# Patient Record
Sex: FEMALE | Race: BLACK OR AFRICAN AMERICAN | NOT HISPANIC OR LATINO | Employment: FULL TIME | URBAN - METROPOLITAN AREA
[De-identification: names, ages, dates, MRNs, and addresses within clinical notes are randomized per-mention and may not be internally consistent; named-entity substitution may affect disease eponyms.]

---

## 2023-10-02 PROBLEM — O14.90 PRE-ECLAMPSIA (HHS-HCC): Status: ACTIVE | Noted: 2023-10-02

## 2023-10-02 PROBLEM — R87.610 ASCUS WITH POSITIVE HIGH RISK HPV CERVICAL: Status: ACTIVE | Noted: 2023-10-02

## 2023-10-02 PROBLEM — R87.810 ASCUS WITH POSITIVE HIGH RISK HPV CERVICAL: Status: ACTIVE | Noted: 2023-10-02

## 2023-10-02 PROBLEM — R74.8 ELEVATED LIVER ENZYMES: Status: ACTIVE | Noted: 2023-10-02

## 2023-10-02 RX ORDER — IBUPROFEN 600 MG/1
600 TABLET ORAL EVERY 6 HOURS
COMMUNITY
Start: 2020-03-02 | End: 2023-10-04 | Stop reason: ALTCHOICE

## 2023-10-02 RX ORDER — NIFEDIPINE 30 MG/1
1 TABLET, FILM COATED, EXTENDED RELEASE ORAL DAILY
COMMUNITY
Start: 2020-03-11 | End: 2023-10-04 | Stop reason: ALTCHOICE

## 2023-10-02 RX ORDER — DOCUSATE SODIUM 100 MG/1
100 CAPSULE, LIQUID FILLED ORAL 2 TIMES DAILY PRN
COMMUNITY
Start: 2020-03-02 | End: 2023-10-04 | Stop reason: ALTCHOICE

## 2023-10-02 RX ORDER — ETONOGESTREL 68 MG/1
IMPLANT SUBCUTANEOUS
COMMUNITY
Start: 2020-03-31 | End: 2023-10-04 | Stop reason: ALTCHOICE

## 2023-10-04 ENCOUNTER — LAB (OUTPATIENT)
Dept: LAB | Facility: LAB | Age: 38
End: 2023-10-04
Payer: COMMERCIAL

## 2023-10-04 ENCOUNTER — OFFICE VISIT (OUTPATIENT)
Dept: OBSTETRICS AND GYNECOLOGY | Facility: CLINIC | Age: 38
End: 2023-10-04
Payer: COMMERCIAL

## 2023-10-04 DIAGNOSIS — Z11.3 ROUTINE SCREENING FOR STI (SEXUALLY TRANSMITTED INFECTION): ICD-10-CM

## 2023-10-04 DIAGNOSIS — Z01.419 WELL WOMAN EXAM WITH ROUTINE GYNECOLOGICAL EXAM: ICD-10-CM

## 2023-10-04 DIAGNOSIS — Z30.09 CONTRACEPTIVE EDUCATION: ICD-10-CM

## 2023-10-04 DIAGNOSIS — Z30.013 ENCOUNTER FOR INITIAL PRESCRIPTION OF INJECTABLE CONTRACEPTIVE: ICD-10-CM

## 2023-10-04 DIAGNOSIS — Z32.02 PREGNANCY TEST NEGATIVE: Primary | ICD-10-CM

## 2023-10-04 LAB
HBV SURFACE AG SERPL QL IA: NONREACTIVE
HCV AB SER QL: NONREACTIVE
HIV 1+2 AB+HIV1 P24 AG SERPL QL IA: NONREACTIVE

## 2023-10-04 PROCEDURE — 36415 COLL VENOUS BLD VENIPUNCTURE: CPT

## 2023-10-04 PROCEDURE — 81025 URINE PREGNANCY TEST: CPT

## 2023-10-04 PROCEDURE — 87389 HIV-1 AG W/HIV-1&-2 AB AG IA: CPT

## 2023-10-04 PROCEDURE — 99213 OFFICE O/P EST LOW 20 MIN: CPT | Mod: SB

## 2023-10-04 PROCEDURE — 87340 HEPATITIS B SURFACE AG IA: CPT

## 2023-10-04 PROCEDURE — 87491 CHLMYD TRACH DNA AMP PROBE: CPT

## 2023-10-04 PROCEDURE — 3079F DIAST BP 80-89 MM HG: CPT

## 2023-10-04 PROCEDURE — 88175 CYTOPATH C/V AUTO FLUID REDO: CPT | Mod: TC,GCY

## 2023-10-04 PROCEDURE — 87591 N.GONORRHOEAE DNA AMP PROB: CPT

## 2023-10-04 PROCEDURE — 87661 TRICHOMONAS VAGINALIS AMPLIF: CPT

## 2023-10-04 PROCEDURE — 3074F SYST BP LT 130 MM HG: CPT

## 2023-10-04 PROCEDURE — 87624 HPV HI-RISK TYP POOLED RSLT: CPT | Mod: CMCLAB

## 2023-10-04 PROCEDURE — 99395 PREV VISIT EST AGE 18-39: CPT

## 2023-10-04 PROCEDURE — 86780 TREPONEMA PALLIDUM: CPT

## 2023-10-04 PROCEDURE — 86803 HEPATITIS C AB TEST: CPT

## 2023-10-04 ASSESSMENT — PAIN SCALES - GENERAL: PAINLEVEL: 0-NO PAIN

## 2023-10-04 NOTE — PROGRESS NOTES
"Assessment/Plan   Problem List Items Addressed This Visit    None  Visit Diagnoses         Codes    Pregnancy test negative    -  Primary Z32.02    Relevant Orders    POC pregnancy, urine    Well woman exam with routine gynecological exam     Z01.419    Relevant Orders    THINPREP PAP    HPV DNA High Risk With Genotype    Routine screening for STI (sexually transmitted infection)     Z11.3    Relevant Orders    HIV 1/2 Antigen/Antibody Screen with Reflex to Confirmation (Completed)    Syphilis Screen with Reflex (Completed)    Hepatitis C Antibody (Completed)    Hepatitis B Surface Antigen (Completed)    Neisseria gonorrhoeae, Amplified (Completed)    Chlamydia Trachomatis, Amplified (Completed)    TRICH VAGINALIS, AMPLIFIED (Completed)    Encounter for initial prescription of injectable contraceptive     Z30.013    Relevant Medications    medroxyPROGESTERone (Depo-Provera) injection 150 mg    Contraceptive education     Z30.09    Relevant Medications    medroxyPROGESTERone (Depo-Provera) injection 150 mg          Discussed condom use x 7 days after depo restart    NIESHA Kurtz-NICHELLE     Sujit Aguilar is a 38 y.o. female who is here for a routine exam. Periods are  n/a - pt is on depo. Last Dose 2023. Has not has a period. ,     Current contraception: Depo-Provera injections  History of abnormal Pap smear: no  Last pap  - neg, neg HPV   Family history of uterine or ovarian cancer: no  Regular self breast exam: yes  History of abnormal mammogram: n/a  Family history of breast cancer: yes - Maternal aunt     Menstrual History:  OB History          5    Para   3    Term   1       2    AB   2    Living             SAB        IAB        Ectopic        Multiple        Live Births                    Menarche age: not documented   No LMP recorded.         Review of Systems   All other systems reviewed and are negative.      Objective   /82   Pulse 78   Ht 1.626 m (5' 4\")   " Wt 85.7 kg (189 lb)   BMI 32.44 kg/m²     General:   alert and oriented, in no acute distress, appears stated age, appears stated age, and cooperative   Heart: def   Lungs: Normal resp effort    Abdomen: soft, non-tender, without masses or organomegaly   Vulva: normal   Vagina: normal mucosa, normal discharge   Cervix: def   Uterus: normal size, mobile, non-tender, normal shape and consistency   Adnexa: normal adnexa and no mass, fullness, tenderness   Gayle Winter, APRN-CNM

## 2023-10-05 LAB
C TRACH RRNA SPEC QL NAA+PROBE: NEGATIVE
N GONORRHOEA DNA SPEC QL PROBE+SIG AMP: NEGATIVE
T PALLIDUM AB SER QL: NONREACTIVE
T VAGINALIS RRNA SPEC QL NAA+PROBE: NEGATIVE

## 2023-10-07 RX ORDER — MEDROXYPROGESTERONE ACETATE 150 MG/ML
150 INJECTION, SUSPENSION INTRAMUSCULAR ONCE
Status: SHIPPED | OUTPATIENT
Start: 2023-10-07

## 2023-10-08 VITALS
BODY MASS INDEX: 32.27 KG/M2 | WEIGHT: 189 LBS | DIASTOLIC BLOOD PRESSURE: 82 MMHG | SYSTOLIC BLOOD PRESSURE: 125 MMHG | HEART RATE: 78 BPM | HEIGHT: 64 IN

## 2023-10-08 LAB — PREGNANCY TEST URINE, POC: NEGATIVE

## 2023-10-18 LAB
CYTOLOGY CMNT CVX/VAG CYTO-IMP: NORMAL
HPV HR GENOTYPES PNL CVX NAA+PROBE: NEGATIVE
HPV HR GENOTYPES PNL CVX NAA+PROBE: NEGATIVE
HPV16 DNA SPEC QL NAA+PROBE: NEGATIVE
HPV18 DNA SPEC QL NAA+PROBE: NEGATIVE
LAB AP HPV GENOTYPE QUESTION: YES
LAB AP HPV HR: NORMAL
LABORATORY COMMENT REPORT: NORMAL
PATH REPORT.TOTAL CANCER: NORMAL

## 2024-01-03 ENCOUNTER — PROCEDURE VISIT (OUTPATIENT)
Dept: OBSTETRICS AND GYNECOLOGY | Facility: CLINIC | Age: 39
End: 2024-01-03
Payer: COMMERCIAL

## 2024-01-03 VITALS
BODY MASS INDEX: 32.39 KG/M2 | DIASTOLIC BLOOD PRESSURE: 85 MMHG | SYSTOLIC BLOOD PRESSURE: 128 MMHG | HEART RATE: 67 BPM | WEIGHT: 188.7 LBS

## 2024-01-03 DIAGNOSIS — Z30.09 CONTRACEPTIVE EDUCATION: ICD-10-CM

## 2024-01-03 DIAGNOSIS — Z30.017 ENCOUNTER FOR INITIAL PRESCRIPTION OF IMPLANTABLE SUBDERMAL CONTRACEPTIVE: Primary | ICD-10-CM

## 2024-01-03 PROCEDURE — 11981 INSERTION DRUG DLVR IMPLANT: CPT

## 2024-01-03 PROCEDURE — 2500000004 HC RX 250 GENERAL PHARMACY W/ HCPCS (ALT 636 FOR OP/ED): Mod: SE,MUE

## 2024-01-03 PROCEDURE — 99213 OFFICE O/P EST LOW 20 MIN: CPT

## 2024-01-03 RX ADMIN — ETONOGESTREL 1 EACH: 68 IMPLANT SUBCUTANEOUS at 12:29

## 2024-01-03 ASSESSMENT — ENCOUNTER SYMPTOMS
RESPIRATORY NEGATIVE: 0
EYES NEGATIVE: 0
PSYCHIATRIC NEGATIVE: 0
ENDOCRINE NEGATIVE: 0
ALLERGIC/IMMUNOLOGIC NEGATIVE: 0
CONSTITUTIONAL NEGATIVE: 0
HEMATOLOGIC/LYMPHATIC NEGATIVE: 0
MUSCULOSKELETAL NEGATIVE: 0
CARDIOVASCULAR NEGATIVE: 0
GASTROINTESTINAL NEGATIVE: 0
NEUROLOGICAL NEGATIVE: 0

## 2024-01-03 ASSESSMENT — PAIN SCALES - GENERAL: PAINLEVEL: 0-NO PAIN

## 2024-01-03 NOTE — PROGRESS NOTES
Subjective   Patient ID: Ofelia Aguilar is a 38 y.o. female who presents for Contraception (Pt here for nextplanon insertion/Dpw-27-19-23/Last pap-11-10-21 wnl/Std-declined/Chaperone-accepted ).    Pt presents today for nexplanon insertion. Has been on Nexplanon in the past, most recently on depo - last dose 10/2023. No concerns today.     Review of Systems   All other systems reviewed and are negative.    Objective   Physical Exam  Constitutional:       Appearance: Normal appearance. She is normal weight.   HENT:      Head: Normocephalic.      Mouth/Throat:      Mouth: Mucous membranes are moist.      Pharynx: Oropharynx is clear.   Eyes:      Pupils: Pupils are equal, round, and reactive to light.   Pulmonary:      Effort: Pulmonary effort is normal.   Abdominal:      Palpations: Abdomen is soft.   Musculoskeletal:      Cervical back: Normal range of motion.   Skin:     General: Skin is warm and dry.   Neurological:      General: No focal deficit present.      Mental Status: She is alert and oriented to person, place, and time. Mental status is at baseline.   Psychiatric:         Mood and Affect: Mood normal.         Behavior: Behavior normal.         Thought Content: Thought content normal.         Judgment: Judgment normal.         Assessment/Plan   Diagnoses and all orders for this visit:  Encounter for initial prescription of implantable subdermal contraceptive  -     etonogestrel-eluting contraceptive implant  -     Insertion of Contraceptive Capsule; Future  Contraceptive education      Subdermal Contraceptive Implant Insertion Note  Ofelia Aguilar desires a subdermal etonogestrel contraceptive implant insertion. She has been counseled regarding the risks, benefits and alternatives to the implant. She especially understands that her menstrual periods are expected to become irregular and unpredictable throughout the time she is using the implant. She has no contraindications to the insertion. Her  questions have been answered. She has fully reviewed the FDA-approved consent brochure, has signed the consent form, and wishes to proceed with the insertion today.     Current method of contraception:  Depo-Provera    Patient's last menstrual period was 12/31/2023.    Urine pregnancy test: n/a    Procedure Details  The inner side of the left arm was cleaned with Betadinex3 and infiltrated with 1% lidocaine. The contraceptive caleb was inserted according to the 's instructions without complications. The caleb was palpable under the skin after the insertion. The insertion site was closed with Steri-strip and a pressure dressing was applied.     Successful insertion of nexplanon device.    Ofelia was given post-insertion instructions. She understands that the implant must be removed at the end of 3 years and may be removed sooner if she wishes.    Other follow-up needed:  none    ALBERTO Kurtz APRN-CNM 01/03/24 11:37 AM